# Patient Record
Sex: FEMALE | Race: WHITE | Employment: FULL TIME | URBAN - METROPOLITAN AREA
[De-identification: names, ages, dates, MRNs, and addresses within clinical notes are randomized per-mention and may not be internally consistent; named-entity substitution may affect disease eponyms.]

---

## 2022-04-06 VITALS — WEIGHT: 169 LBS | BODY MASS INDEX: 28.16 KG/M2 | HEIGHT: 65 IN

## 2022-04-06 NOTE — PERIOP NOTES
Enhanced Recovery After GYN Surgery: non-diabetic patients    It is highly recommended you purchase and drink Ensure Complete - one bottle twice daily for five days starting on 4/29/22 through 5/3/22. Ensure Complete is the preferred formula over other Ensure formulas. It is recommended that you continue drinking this for one month after surgery. The night before surgery on 5/4/22, drink 2 bottles of the Ensure Pre-Surgery drink. The morning of surgery on 5/5/22, drink one bottle of the Ensure Pre-Surgery drink while on your way to the hospital. Drink this over 5-10 minutes. Drink nothing else after drinking the pre-surgical drink the morning of surgery. Bring your patient handbook with you to the hospital.    Things to remember:    1. You will be given clear liquids to drink, advancing diet as tolerated    2. You will be up and moving around with assistance 2-4 hours after surgery. 3. You will be given regularly scheduled pain medications (NSAIDS, Tylenol, Gabapentin) with narcotics as needed. 4. You may be able to go home that night if the surgeon okays and you are up and eating and drinking. Otherwise, your discharge will be the following morning around lunch time. 5. Continue drinking Ensure Complete for 5 days after surgery.

## 2022-04-06 NOTE — PERIOP NOTES
PLEASE CONTINUE TAKING ALL PRESCRIPTION MEDICATIONS UP TO THE DAY OF SURGERY UNLESS OTHERWISE DIRECTED BELOW. DISCONTINUE all vitamins, herbals, and supplements 7 days prior to surgery. DISCONTINUE Non-Steriodal Anti-Inflammatory (NSAIDS) such as Advil, Ibuprofen, Motrin, Aspirin, Naproxen, and Aleve 5 days prior to surgery. Home Medications to take  the day of surgery (5/5/22)      NONE           Home Medications   to Hold           Comments      On the day before surgery (5/4/22)  please take Acetaminophen 1000mg in the morning and then again before bed. You may substitute for Tylenol 650 mg. Please do not bring home medications with you on the day of surgery unless otherwise directed by your nurse. If you are instructed to bring home medications, please give them to your nurse as they will be administered by the nursing staff. If you have any questions, please call [facility] [phone]. A copy of this note was provided to the patient for reference.

## 2022-04-06 NOTE — PERIOP NOTES
Patient verified name and     Order for consent NOT found in EHR; patient verified. Type 2 surgery phone assessment complete. Labs per surgeon: unknown; orders NOT received. Labs per anesthesia protocol: HGB to be collected on 22. T&S to be collected dos. EKG: not needed per anesthesia protocols. EKG dated 21 available under media tab in EHR for reference. Patient informed of GYN class on 22 at which time labs will be drawn. Patient will also receive all patient education and hospital approved surgical skin cleanser to use per hospital policy. Patient instructed to hold all vitamins 7 days prior to surgery and NSAIDS 5 days prior to surgery, patient verbalized understanding. Patient answered medical/surgical history questions at their best of ability. All prior to admission medications documented in [facility].

## 2022-04-13 ENCOUNTER — HOSPITAL ENCOUNTER (OUTPATIENT)
Dept: SURGERY | Age: 47
Discharge: HOME | End: 2022-04-13

## 2022-04-20 ENCOUNTER — HOSPITAL ENCOUNTER (OUTPATIENT)
Dept: SURGERY | Age: 47
Discharge: HOME | End: 2022-04-20
Attending: OBSTETRICS & GYNECOLOGY
Payer: COMMERCIAL

## 2022-04-20 LAB — HGB BLD-MCNC: 13 G/DL (ref 11.7–15.4)

## 2022-04-20 PROCEDURE — 36415 COLL VENOUS BLD VENIPUNCTURE: CPT

## 2022-04-20 PROCEDURE — 85018 HEMOGLOBIN: CPT

## 2022-04-20 NOTE — PROGRESS NOTES
Patient attended [facility]/ GYN surgery orientation class today. Detailed instruction book regarding GYN surgery was provided at start of class. Class content included pre-operative instructions for surgery in the week prior to and day before surgery. Packet including Hibiclens and printed instructions on bathing was provided to patient. Detailed and printed diet instruction and presurgical drinks were also given to patient  in accordance with [facility] protocol. Detailed information was given regarding arriving at the hospital and instructions for the patient's day of surgery. Discussed recovery from surgery, hospital stay, pain management, and discharge. Reviewed recovery at home including pelvic rest, driving and activity restrictions, issues requiring call to physician etc. Afsaneh Erb all questions in detail. Patient voices understanding of all.

## 2022-05-03 RX ORDER — SODIUM CHLORIDE 0.9 % (FLUSH) 0.9 %
5-40 SYRINGE (ML) INJECTION EVERY 8 HOURS
Status: CANCELLED | OUTPATIENT
Start: 2022-05-03

## 2022-05-03 RX ORDER — SODIUM CHLORIDE 0.9 % (FLUSH) 0.9 %
5-40 SYRINGE (ML) INJECTION AS NEEDED
Status: CANCELLED | OUTPATIENT
Start: 2022-05-03

## 2022-05-04 ENCOUNTER — ANESTHESIA EVENT (OUTPATIENT)
Dept: SURGERY | Age: 47
End: 2022-05-04
Payer: COMMERCIAL

## 2022-05-04 RX ORDER — SODIUM CHLORIDE 0.9 % (FLUSH) 0.9 %
5-40 SYRINGE (ML) INJECTION AS NEEDED
Status: CANCELLED | OUTPATIENT
Start: 2022-05-04

## 2022-05-04 RX ORDER — SODIUM CHLORIDE 0.9 % (FLUSH) 0.9 %
5-40 SYRINGE (ML) INJECTION EVERY 8 HOURS
Status: CANCELLED | OUTPATIENT
Start: 2022-05-04

## 2022-05-04 RX ORDER — TRISODIUM CITRATE DIHYDRATE AND CITRIC ACID MONOHYDRATE 500; 334 MG/5ML; MG/5ML
30 SOLUTION ORAL
Status: CANCELLED | OUTPATIENT
Start: 2022-05-04 | End: 2022-05-05

## 2022-05-04 NOTE — H&P
Subjective:     Christina Gutierrez, MRN: [id number], is a 40 y.o.  female presents with AUB and right ovarian mass 4-5 cm, for hysterectomy. No more children. See office notes on  care. There are no problems to display for this patient. Past Medical History:   Diagnosis Date    Anxiety     Depression     denies    Former smoker     Headache     denies    History of COVID-19 2022, 2020    not hospitalized     History of left bundle branch block (LBBB)     showed on an EKG 20 yrs ago (noted 22); EKG dated 21 \"sinus bradycardia, wnl\"     Palpitations     Postoperative keloid scar     from  and breast surgery     Thyroid disease     thyroid nodule and cyst that is monitored      Past Surgical History:   Procedure Laterality Date    HX BREAST REDUCTION Bilateral 2016    BREAST REDUCTION BILATERAL performed by Harjinder Gonzalez MD at [facility] [address] HX  SECTION      x1    HX COLONOSCOPY        [unfilled]  Allergies   Allergen Reactions    Bactrim [Sulfamethoprim Ds] Anaphylaxis and Swelling    Sulfa (Sulfonamide Antibiotics) Anaphylaxis and Swelling     Other reaction(s): Lips/Mouth swelling-Allergy    Sulfamethoxazole-Trimethoprim Other (comments)      Social History     Tobacco Use    Smoking status: Former Smoker     Packs/day: 0.25     Years: 16.00     Pack years: 4.00     Quit date: 2011     Years since quitting: 10.4    Smokeless tobacco: Never Used   Substance Use Topics    Alcohol use: Not Currently      Family History   Problem Relation Age of Onset    Heart Disease Mother     Diabetes Mother     No Known Problems Father         Review of Systems  Constitutional: negative  Respiratory: negative  Cardiovascular: negative  Musculoskeletal:negative    Objective:     No data found. No intake or output data in the 24 hours ending 22 1710  There were no vitals taken for this visit.   General appearance: alert, cooperative, no distress, appears stated age  Head: Normocephalic, without obvious abnormality, atraumatic  Back: symmetric, no curvature. ROM normal. No CVA tenderness. Lungs: clear to auscultation bilaterally  Heart: regular rate and rhythm, S1, S2 normal, no murmur, click, rub or gallop  Abdomen: soft, non-tender. Bowel sounds normal. No masses,  no organomegaly  Extremities: extremities normal, atraumatic, no cyanosis or edema  Pulses: 2+ and symmetric  Skin: Skin color, texture, turgor normal. No rashes or lesions      Assessment:     Active Problems:    * No active hospital problems. *      TLH/RSO/LS. Discussed risks of infection, DVT, damage to bowel/bladder/other internal organs, bleeding/transfusion, scar tissue/adhesions. All questions answered, will proceed.       Plan:     TLH/RSO/LS under general anesthesia

## 2022-05-04 NOTE — ANESTHESIA PREPROCEDURE EVALUATION
Anesthetic History   No history of anesthetic complications            Review of Systems / Medical History  Patient summary reviewed, nursing notes reviewed and pertinent labs reviewed    Pulmonary  Within defined limits                 Neuro/Psych         Headaches and psychiatric history     Cardiovascular                  Exercise tolerance: >4 METS     GI/Hepatic/Renal         Renal disease: stones       Endo/Other      Hypothyroidism       Other Findings   Comments: Hgb 13 in April 2022           Physical Exam    Airway  Mallampati: II  TM Distance: < 4 cm  Neck ROM: normal range of motion   Mouth opening: Diminished (comment)     Cardiovascular  Regular rate and rhythm,  S1 and S2 normal,  no murmur, click, rub, or gallop  Rhythm: regular  Rate: normal         Dental  No notable dental hx       Pulmonary  Breath sounds clear to auscultation               Abdominal         Other Findings            Anesthetic Plan    ASA: 2  Anesthesia type: general          Induction: Intravenous  Anesthetic plan and risks discussed with: Patient      Small mouth opening - GETA.

## 2022-05-05 ENCOUNTER — HOSPITAL ENCOUNTER (OUTPATIENT)
Age: 47
Discharge: HOME | End: 2022-05-05
Attending: OBSTETRICS & GYNECOLOGY | Admitting: OBSTETRICS & GYNECOLOGY
Payer: COMMERCIAL

## 2022-05-05 ENCOUNTER — ANESTHESIA (OUTPATIENT)
Dept: SURGERY | Age: 47
End: 2022-05-05
Payer: COMMERCIAL

## 2022-05-05 VITALS
OXYGEN SATURATION: 100 % | TEMPERATURE: 97.2 F | BODY MASS INDEX: 28.44 KG/M2 | HEART RATE: 79 BPM | RESPIRATION RATE: 17 BRPM | HEIGHT: 65 IN | DIASTOLIC BLOOD PRESSURE: 63 MMHG | SYSTOLIC BLOOD PRESSURE: 109 MMHG | WEIGHT: 170.7 LBS

## 2022-05-05 DIAGNOSIS — N93.9 ABNORMAL UTERINE BLEEDING (AUB): ICD-10-CM

## 2022-05-05 DIAGNOSIS — Z90.710 S/P LAPAROSCOPIC HYSTERECTOMY: Primary | ICD-10-CM

## 2022-05-05 LAB
ABO + RH BLD: NORMAL
BLOOD GROUP ANTIBODIES SERPL: NORMAL
HCG UR QL: NEGATIVE
SPECIMEN EXP DATE BLD: NORMAL

## 2022-05-05 PROCEDURE — 76010000172 HC OR TIME 2.5 TO 3 HR INTENSV-TIER 1: Performed by: OBSTETRICS & GYNECOLOGY

## 2022-05-05 PROCEDURE — 74011250636 HC RX REV CODE- 250/636: Performed by: OBSTETRICS & GYNECOLOGY

## 2022-05-05 PROCEDURE — 58571 TLH W/T/O 250 G OR LESS: CPT | Performed by: OBSTETRICS & GYNECOLOGY

## 2022-05-05 PROCEDURE — 77030037285 HC MANIP UTER DELINTR ADVINCULA DISP COOP -C: Performed by: OBSTETRICS & GYNECOLOGY

## 2022-05-05 PROCEDURE — 74011000250 HC RX REV CODE- 250

## 2022-05-05 PROCEDURE — 94760 N-INVAS EAR/PLS OXIMETRY 1: CPT

## 2022-05-05 PROCEDURE — 77030040922 HC BLNKT HYPOTHRM STRY -A: Performed by: ANESTHESIOLOGY

## 2022-05-05 PROCEDURE — 74011000250 HC RX REV CODE- 250: Performed by: ANESTHESIOLOGY

## 2022-05-05 PROCEDURE — 86900 BLOOD TYPING SEROLOGIC ABO: CPT

## 2022-05-05 PROCEDURE — 81025 URINE PREGNANCY TEST: CPT

## 2022-05-05 PROCEDURE — 77030019908 HC STETH ESOPH SIMS -A: Performed by: ANESTHESIOLOGY

## 2022-05-05 PROCEDURE — 77030003028 HC SUT VCRL J&J -A: Performed by: OBSTETRICS & GYNECOLOGY

## 2022-05-05 PROCEDURE — 76060000036 HC ANESTHESIA 2.5 TO 3 HR: Performed by: OBSTETRICS & GYNECOLOGY

## 2022-05-05 PROCEDURE — 77030008606 HC TRCR ENDOSC KII AMR -B: Performed by: OBSTETRICS & GYNECOLOGY

## 2022-05-05 PROCEDURE — 74011250636 HC RX REV CODE- 250/636: Performed by: ANESTHESIOLOGY

## 2022-05-05 PROCEDURE — 74011250636 HC RX REV CODE- 250/636

## 2022-05-05 PROCEDURE — 88307 TISSUE EXAM BY PATHOLOGIST: CPT

## 2022-05-05 PROCEDURE — 76210000063 HC OR PH I REC FIRST 0.5 HR: Performed by: OBSTETRICS & GYNECOLOGY

## 2022-05-05 PROCEDURE — 74011000250 HC RX REV CODE- 250: Performed by: OBSTETRICS & GYNECOLOGY

## 2022-05-05 PROCEDURE — 77030021678 HC GLIDESCP STAT DISP VERT -B: Performed by: ANESTHESIOLOGY

## 2022-05-05 PROCEDURE — 77030037088 HC TUBE ENDOTRACH ORAL NSL COVD-A: Performed by: ANESTHESIOLOGY

## 2022-05-05 PROCEDURE — 77030031492 HC PRT ACC BLNT AIRSEAL CNMD -B: Performed by: OBSTETRICS & GYNECOLOGY

## 2022-05-05 PROCEDURE — 2709999900 HC NON-CHARGEABLE SUPPLY: Performed by: OBSTETRICS & GYNECOLOGY

## 2022-05-05 PROCEDURE — 77030010507 HC ADH SKN DERMBND J&J -B: Performed by: OBSTETRICS & GYNECOLOGY

## 2022-05-05 PROCEDURE — 77030039425 HC BLD LARYNG TRULITE DISP TELE -A: Performed by: ANESTHESIOLOGY

## 2022-05-05 PROCEDURE — 74011250637 HC RX REV CODE- 250/637: Performed by: OBSTETRICS & GYNECOLOGY

## 2022-05-05 PROCEDURE — 77030040361 HC SLV COMPR DVT MDII -B: Performed by: OBSTETRICS & GYNECOLOGY

## 2022-05-05 PROCEDURE — 74011250637 HC RX REV CODE- 250/637: Performed by: ANESTHESIOLOGY

## 2022-05-05 PROCEDURE — 77030031139 HC SUT VCRL2 J&J -A: Performed by: OBSTETRICS & GYNECOLOGY

## 2022-05-05 PROCEDURE — 77030012770 HC TRCR OPT FX AMR -B: Performed by: OBSTETRICS & GYNECOLOGY

## 2022-05-05 RX ORDER — BUPIVACAINE HYDROCHLORIDE 5 MG/ML
INJECTION, SOLUTION EPIDURAL; INTRACAUDAL AS NEEDED
Status: DISCONTINUED | OUTPATIENT
Start: 2022-05-05 | End: 2022-05-05 | Stop reason: HOSPADM

## 2022-05-05 RX ORDER — ALBUTEROL SULFATE 0.83 MG/ML
2.5 SOLUTION RESPIRATORY (INHALATION)
Status: DISCONTINUED | OUTPATIENT
Start: 2022-05-05 | End: 2022-05-05 | Stop reason: HOSPADM

## 2022-05-05 RX ORDER — CEFAZOLIN SODIUM/WATER 2 G/20 ML
2 SYRINGE (ML) INTRAVENOUS ONCE
Status: COMPLETED | OUTPATIENT
Start: 2022-05-05 | End: 2022-05-05

## 2022-05-05 RX ORDER — PROMETHAZINE HYDROCHLORIDE 25 MG/1
25 TABLET ORAL
Status: DISCONTINUED | OUTPATIENT
Start: 2022-05-05 | End: 2022-05-05 | Stop reason: HOSPADM

## 2022-05-05 RX ORDER — DIPHENHYDRAMINE HCL 25 MG
25 CAPSULE ORAL
Status: DISCONTINUED | OUTPATIENT
Start: 2022-05-05 | End: 2022-05-05 | Stop reason: HOSPADM

## 2022-05-05 RX ORDER — ACETAMINOPHEN 325 MG/1
650 TABLET ORAL ONCE
Status: COMPLETED | OUTPATIENT
Start: 2022-05-05 | End: 2022-05-05

## 2022-05-05 RX ORDER — LIDOCAINE HYDROCHLORIDE 20 MG/ML
INJECTION, SOLUTION EPIDURAL; INFILTRATION; INTRACAUDAL; PERINEURAL AS NEEDED
Status: DISCONTINUED | OUTPATIENT
Start: 2022-05-05 | End: 2022-05-05 | Stop reason: HOSPADM

## 2022-05-05 RX ORDER — KETOROLAC TROMETHAMINE 30 MG/ML
INJECTION, SOLUTION INTRAMUSCULAR; INTRAVENOUS AS NEEDED
Status: DISCONTINUED | OUTPATIENT
Start: 2022-05-05 | End: 2022-05-05 | Stop reason: HOSPADM

## 2022-05-05 RX ORDER — PROMETHAZINE HYDROCHLORIDE 25 MG/ML
12.5 INJECTION, SOLUTION INTRAMUSCULAR; INTRAVENOUS
Status: DISCONTINUED | OUTPATIENT
Start: 2022-05-05 | End: 2022-05-05 | Stop reason: RX

## 2022-05-05 RX ORDER — OXYCODONE HYDROCHLORIDE 10 MG/1
5 TABLET ORAL
Qty: 12 TABLET | Refills: 0 | Status: SHIPPED | OUTPATIENT
Start: 2022-05-05 | End: 2022-05-10

## 2022-05-05 RX ORDER — DEXAMETHASONE SODIUM PHOSPHATE 4 MG/ML
INJECTION, SOLUTION INTRA-ARTICULAR; INTRALESIONAL; INTRAMUSCULAR; INTRAVENOUS; SOFT TISSUE AS NEEDED
Status: DISCONTINUED | OUTPATIENT
Start: 2022-05-05 | End: 2022-05-05 | Stop reason: HOSPADM

## 2022-05-05 RX ORDER — DOCUSATE SODIUM 100 MG/1
100 CAPSULE, LIQUID FILLED ORAL 2 TIMES DAILY
Status: DISCONTINUED | OUTPATIENT
Start: 2022-05-05 | End: 2022-05-05 | Stop reason: HOSPADM

## 2022-05-05 RX ORDER — ONDANSETRON 2 MG/ML
4 INJECTION INTRAMUSCULAR; INTRAVENOUS
Status: DISCONTINUED | OUTPATIENT
Start: 2022-05-05 | End: 2022-05-05 | Stop reason: HOSPADM

## 2022-05-05 RX ORDER — SODIUM CHLORIDE 0.9 % (FLUSH) 0.9 %
5-40 SYRINGE (ML) INJECTION EVERY 8 HOURS
Status: DISCONTINUED | OUTPATIENT
Start: 2022-05-05 | End: 2022-05-05 | Stop reason: HOSPADM

## 2022-05-05 RX ORDER — OXYCODONE HYDROCHLORIDE 5 MG/1
5 TABLET ORAL
Status: DISCONTINUED | OUTPATIENT
Start: 2022-05-05 | End: 2022-05-05 | Stop reason: HOSPADM

## 2022-05-05 RX ORDER — IBUPROFEN 800 MG/1
800 TABLET ORAL
Status: DISCONTINUED | OUTPATIENT
Start: 2022-05-05 | End: 2022-05-05 | Stop reason: HOSPADM

## 2022-05-05 RX ORDER — SODIUM CHLORIDE 0.9 % (FLUSH) 0.9 %
5-40 SYRINGE (ML) INJECTION AS NEEDED
Status: DISCONTINUED | OUTPATIENT
Start: 2022-05-05 | End: 2022-05-05 | Stop reason: HOSPADM

## 2022-05-05 RX ORDER — NALOXONE HYDROCHLORIDE 0.4 MG/ML
0.2 INJECTION, SOLUTION INTRAMUSCULAR; INTRAVENOUS; SUBCUTANEOUS AS NEEDED
Status: DISCONTINUED | OUTPATIENT
Start: 2022-05-05 | End: 2022-05-05 | Stop reason: HOSPADM

## 2022-05-05 RX ORDER — HYDROMORPHONE HYDROCHLORIDE 1 MG/ML
0.5 INJECTION, SOLUTION INTRAMUSCULAR; INTRAVENOUS; SUBCUTANEOUS
Status: DISCONTINUED | OUTPATIENT
Start: 2022-05-05 | End: 2022-05-05 | Stop reason: HOSPADM

## 2022-05-05 RX ORDER — EPHEDRINE SULFATE/0.9% NACL/PF 50 MG/5 ML
SYRINGE (ML) INTRAVENOUS AS NEEDED
Status: DISCONTINUED | OUTPATIENT
Start: 2022-05-05 | End: 2022-05-05 | Stop reason: HOSPADM

## 2022-05-05 RX ORDER — NEOSTIGMINE METHYLSULFATE 1 MG/ML
INJECTION, SOLUTION INTRAVENOUS AS NEEDED
Status: DISCONTINUED | OUTPATIENT
Start: 2022-05-05 | End: 2022-05-05 | Stop reason: HOSPADM

## 2022-05-05 RX ORDER — ONDANSETRON 2 MG/ML
4 INJECTION INTRAMUSCULAR; INTRAVENOUS ONCE
Status: DISCONTINUED | OUTPATIENT
Start: 2022-05-05 | End: 2022-05-05 | Stop reason: HOSPADM

## 2022-05-05 RX ORDER — IBUPROFEN 800 MG/1
800 TABLET ORAL
Qty: 40 TABLET | Refills: 1 | Status: SHIPPED | OUTPATIENT
Start: 2022-05-05 | End: 2022-05-19 | Stop reason: ALTCHOICE

## 2022-05-05 RX ORDER — PROPOFOL 10 MG/ML
INJECTION, EMULSION INTRAVENOUS AS NEEDED
Status: DISCONTINUED | OUTPATIENT
Start: 2022-05-05 | End: 2022-05-05 | Stop reason: HOSPADM

## 2022-05-05 RX ORDER — MIDAZOLAM HYDROCHLORIDE 1 MG/ML
2 INJECTION, SOLUTION INTRAMUSCULAR; INTRAVENOUS
Status: DISCONTINUED | OUTPATIENT
Start: 2022-05-05 | End: 2022-05-05 | Stop reason: HOSPADM

## 2022-05-05 RX ORDER — ONDANSETRON 2 MG/ML
INJECTION INTRAMUSCULAR; INTRAVENOUS
Status: COMPLETED
Start: 2022-05-05 | End: 2022-05-05

## 2022-05-05 RX ORDER — OXYCODONE HYDROCHLORIDE 5 MG/1
10 TABLET ORAL
Status: DISCONTINUED | OUTPATIENT
Start: 2022-05-05 | End: 2022-05-05 | Stop reason: HOSPADM

## 2022-05-05 RX ORDER — LIDOCAINE HYDROCHLORIDE 10 MG/ML
0.1 INJECTION INFILTRATION; PERINEURAL AS NEEDED
Status: DISCONTINUED | OUTPATIENT
Start: 2022-05-05 | End: 2022-05-05 | Stop reason: HOSPADM

## 2022-05-05 RX ORDER — GLYCOPYRROLATE 0.2 MG/ML
INJECTION INTRAMUSCULAR; INTRAVENOUS AS NEEDED
Status: DISCONTINUED | OUTPATIENT
Start: 2022-05-05 | End: 2022-05-05 | Stop reason: HOSPADM

## 2022-05-05 RX ORDER — ACETAMINOPHEN 500 MG
500 TABLET ORAL
Status: DISCONTINUED | OUTPATIENT
Start: 2022-05-05 | End: 2022-05-05 | Stop reason: HOSPADM

## 2022-05-05 RX ORDER — ROCURONIUM BROMIDE 10 MG/ML
INJECTION, SOLUTION INTRAVENOUS AS NEEDED
Status: DISCONTINUED | OUTPATIENT
Start: 2022-05-05 | End: 2022-05-05 | Stop reason: HOSPADM

## 2022-05-05 RX ORDER — NALOXONE HYDROCHLORIDE 0.4 MG/ML
0.4 INJECTION, SOLUTION INTRAMUSCULAR; INTRAVENOUS; SUBCUTANEOUS AS NEEDED
Status: DISCONTINUED | OUTPATIENT
Start: 2022-05-05 | End: 2022-05-05 | Stop reason: HOSPADM

## 2022-05-05 RX ORDER — ACETAMINOPHEN 500 MG
1000 TABLET ORAL
COMMUNITY
End: 2022-05-19 | Stop reason: ALTCHOICE

## 2022-05-05 RX ORDER — FENTANYL CITRATE 50 UG/ML
INJECTION, SOLUTION INTRAMUSCULAR; INTRAVENOUS AS NEEDED
Status: DISCONTINUED | OUTPATIENT
Start: 2022-05-05 | End: 2022-05-05 | Stop reason: HOSPADM

## 2022-05-05 RX ORDER — FLUMAZENIL 0.1 MG/ML
0.2 INJECTION INTRAVENOUS
Status: DISCONTINUED | OUTPATIENT
Start: 2022-05-05 | End: 2022-05-05 | Stop reason: HOSPADM

## 2022-05-05 RX ORDER — SODIUM CHLORIDE, SODIUM LACTATE, POTASSIUM CHLORIDE, CALCIUM CHLORIDE 600; 310; 30; 20 MG/100ML; MG/100ML; MG/100ML; MG/100ML
100 INJECTION, SOLUTION INTRAVENOUS CONTINUOUS
Status: DISCONTINUED | OUTPATIENT
Start: 2022-05-05 | End: 2022-05-05 | Stop reason: HOSPADM

## 2022-05-05 RX ORDER — ONDANSETRON 2 MG/ML
INJECTION INTRAMUSCULAR; INTRAVENOUS AS NEEDED
Status: DISCONTINUED | OUTPATIENT
Start: 2022-05-05 | End: 2022-05-05 | Stop reason: HOSPADM

## 2022-05-05 RX ORDER — KETAMINE HYDROCHLORIDE 50 MG/ML
INJECTION, SOLUTION INTRAMUSCULAR; INTRAVENOUS AS NEEDED
Status: DISCONTINUED | OUTPATIENT
Start: 2022-05-05 | End: 2022-05-05 | Stop reason: HOSPADM

## 2022-05-05 RX ORDER — SODIUM CHLORIDE, SODIUM LACTATE, POTASSIUM CHLORIDE, CALCIUM CHLORIDE 600; 310; 30; 20 MG/100ML; MG/100ML; MG/100ML; MG/100ML
75 INJECTION, SOLUTION INTRAVENOUS CONTINUOUS
Status: DISCONTINUED | OUTPATIENT
Start: 2022-05-05 | End: 2022-05-05 | Stop reason: HOSPADM

## 2022-05-05 RX ADMIN — PHENYLEPHRINE HYDROCHLORIDE 100 MCG: 10 INJECTION INTRAVENOUS at 11:08

## 2022-05-05 RX ADMIN — ONDANSETRON 4 MG: 2 INJECTION INTRAMUSCULAR; INTRAVENOUS at 08:55

## 2022-05-05 RX ADMIN — Medication 10 MG: at 09:08

## 2022-05-05 RX ADMIN — KETAMINE HYDROCHLORIDE 15 MG: 50 INJECTION, SOLUTION INTRAMUSCULAR; INTRAVENOUS at 08:33

## 2022-05-05 RX ADMIN — ONDANSETRON 4 MG: 2 INJECTION INTRAMUSCULAR; INTRAVENOUS at 11:00

## 2022-05-05 RX ADMIN — GLYCOPYRROLATE 0.4 MG: 0.2 INJECTION, SOLUTION INTRAMUSCULAR; INTRAVENOUS at 09:50

## 2022-05-05 RX ADMIN — KETOROLAC TROMETHAMINE 30 MG: 30 INJECTION, SOLUTION INTRAMUSCULAR; INTRAVENOUS at 09:54

## 2022-05-05 RX ADMIN — Medication 3 MG: at 09:50

## 2022-05-05 RX ADMIN — DOCUSATE SODIUM 100 MG: 100 CAPSULE ORAL at 13:51

## 2022-05-05 RX ADMIN — DEXAMETHASONE SODIUM PHOSPHATE 8 MG: 4 INJECTION, SOLUTION INTRAMUSCULAR; INTRAVENOUS at 07:48

## 2022-05-05 RX ADMIN — ACETAMINOPHEN 325MG 650 MG: 325 TABLET ORAL at 06:23

## 2022-05-05 RX ADMIN — FENTANYL CITRATE 100 MCG: 50 INJECTION INTRAMUSCULAR; INTRAVENOUS at 07:25

## 2022-05-05 RX ADMIN — Medication 10 MG: at 07:47

## 2022-05-05 RX ADMIN — Medication 10 MG: at 07:42

## 2022-05-05 RX ADMIN — PROPOFOL 200 MG: 10 INJECTION, EMULSION INTRAVENOUS at 07:32

## 2022-05-05 RX ADMIN — ROCURONIUM BROMIDE 10 MG: 10 INJECTION, SOLUTION INTRAVENOUS at 08:50

## 2022-05-05 RX ADMIN — ROCURONIUM BROMIDE 40 MG: 10 INJECTION, SOLUTION INTRAVENOUS at 07:32

## 2022-05-05 RX ADMIN — SODIUM CHLORIDE, PRESERVATIVE FREE 10 ML: 5 INJECTION INTRAVENOUS at 17:17

## 2022-05-05 RX ADMIN — KETAMINE HYDROCHLORIDE 30 MG: 50 INJECTION, SOLUTION INTRAMUSCULAR; INTRAVENOUS at 07:32

## 2022-05-05 RX ADMIN — ROCURONIUM BROMIDE 10 MG: 10 INJECTION, SOLUTION INTRAVENOUS at 07:58

## 2022-05-05 RX ADMIN — ACETAMINOPHEN 500 MG: 500 TABLET, FILM COATED ORAL at 17:17

## 2022-05-05 RX ADMIN — Medication 2 G: at 07:39

## 2022-05-05 RX ADMIN — IBUPROFEN 800 MG: 800 TABLET, FILM COATED ORAL at 13:51

## 2022-05-05 RX ADMIN — DOCUSATE SODIUM 100 MG: 100 CAPSULE ORAL at 17:17

## 2022-05-05 RX ADMIN — LIDOCAINE HYDROCHLORIDE 100 MG: 20 INJECTION, SOLUTION EPIDURAL; INFILTRATION; INTRACAUDAL; PERINEURAL at 07:32

## 2022-05-05 RX ADMIN — SODIUM CHLORIDE, SODIUM LACTATE, POTASSIUM CHLORIDE, AND CALCIUM CHLORIDE 100 ML/HR: 600; 310; 30; 20 INJECTION, SOLUTION INTRAVENOUS at 06:37

## 2022-05-05 RX ADMIN — LIDOCAINE HYDROCHLORIDE 0.1 ML: 10 INJECTION, SOLUTION INFILTRATION; PERINEURAL at 06:37

## 2022-05-05 NOTE — PROGRESS NOTES
05/05/22 1210   Dual Skin Pressure Injury Assessment   Dual Skin Pressure Injury Assessment WDL   Second Care Provider (Based on Facility Policy) DANTE Villegas   Skin Integumentary   Skin Integumentary (WDL) X    Pressure  Injury Documentation No Pressure Injury Noted-Pressure Ulcer Prevention Initiated   Skin Integrity Incision (comment)  (3 abd Lap sites)

## 2022-05-05 NOTE — PROGRESS NOTES
TRANSFER - IN REPORT:    Verbal report received from DANTE Connell(name) on Stacy Free  being received from PACU(unit) for routine progression of care      Report consisted of patients Situation, Background, Assessment and   Recommendations(SBAR). Information from the following report(s) SBAR and ED Summary was reviewed with the receiving nurse. Opportunity for questions and clarification was provided. Assessment completed upon patients arrival to unit and care assumed.

## 2022-05-05 NOTE — ANESTHESIA POSTPROCEDURE EVALUATION
Procedure(s): HYSTERECTOMY TOTAL LAPAROSCOPIC/ BILATERAL SALPINGECTOMY/ RIGHT OOPHORECTOMY.     general    Anesthesia Post Evaluation      Multimodal analgesia: multimodal analgesia used between 6 hours prior to anesthesia start to PACU discharge  Patient location during evaluation: PACU  Patient participation: complete - patient participated  Level of consciousness: awake and alert  Pain management: adequate  Airway patency: patent  Anesthetic complications: no  Cardiovascular status: acceptable  Respiratory status: acceptable  Hydration status: acceptable  Post anesthesia nausea and vomiting:  none  Final Post Anesthesia Temperature Assessment:  Normothermia (36.0-37.5 degrees C)      INITIAL Post-op Vital signs:   Vitals Value Taken Time   /58 05/05/22 1035   Temp 36.6 °C (97.9 °F) 05/05/22 1005   Pulse 53 05/05/22 1035   Resp 16 05/05/22 1035   SpO2 99 % 05/05/22 1005

## 2022-05-05 NOTE — BRIEF OP NOTE
Brief Postoperative Note    Patient: Bobbi Perry  YOB: 1975  MRN: [id number]    Date of Procedure: 5/5/2022     Pre-Op Diagnosis: Abnormal uterine bleeding (AUB) [N93.9]  Pelvic pain [R10.2]    Post-Op Diagnosis: Same as preoperative diagnosis. Procedure(s): HYSTERECTOMY TOTAL LAPAROSCOPIC/ BILATERAL SALPINGECTOMY/ RIGHT OOPHORECTOMY    Surgeon(s):  MD Pau Gonzalez MD    Surgical Assistant: Onur Lopez MD    Anesthesia: General     Estimated Blood Loss (mL): less than 444     Complications:  Other: severe pelvic adhesions    Specimens:   ID Type Source Tests Collected by Time Destination   1 : uterus, bilateral tubes, right ovary Fresh   Gumaro Zhong MD 5/5/2022 0910 Pathology        Implants: * No implants in log *    Drains: * No LDAs found *    Findings: enlarged uterus, enlarged right ovary, severe adhesions from uterus to anterior peritoneum, adhesions of left ovary and tube    Electronically Signed by Dayami Betancourt MD on 5/5/2022 at 10:19 AM

## 2022-05-05 NOTE — PROGRESS NOTES
's pre-procedure visit requested by patient. Conveyed care and concern for patient and family. Offered prayer as requested for patient, family, and staff.     Hansel Hoff MDiv, BS  Board Certified

## 2022-05-05 NOTE — PROGRESS NOTES
Chart screen completed. Pt is a 41 y/o female admitted for a total hysterectomy. Pt is insured and has a PCP. No discharge planning needs noted, however CM will continue to follow.

## 2022-05-05 NOTE — PERIOP NOTES
Patient drank Pre-Surgical drink as instructed:   2 Pre Surgical drinks before midnight and Pre Surgical drink consumed over 5-10 minutes PTA DOS    Warmer placed on patient's bed. Warm IV fluids infusing in pre-op per [facility] protocol. Pt's , Daryle Skene [phone], in waiting room with pt's belongings.

## 2022-05-06 NOTE — DISCHARGE SUMMARY
[facility] Discharge Summary     Patient ID:  Ghazala Hastings  [id number]  40 y.o.  1975    Admit date: 5/5/2022    Discharge date and time: 5/5/2022  7:23 PM     Admitting Physician: Sherri Bro MD     Discharge Physician: Toula Cooks, M.D. Admission Diagnoses: Abnormal uterine bleeding (AUB) [N93.9]; Pelvic pain [R10.2]    Problem List: Hospital - Active Problems:    Abnormal uterine bleeding (AUB) (5/5/2022)     ; Other -   Patient Active Problem List   Diagnosis Code    Abnormal uterine bleeding (AUB) N93.9        Discharge Diagnoses: Abnormal uterine bleeding (AUB) [N93.9]; Pelvic pain [R10.2]    Hospital Course: Ghazala Hastings had unremarkeable progressive recovery. and Eating, ambulating, and voiding in a routine manner. Significant Diagnostic Studies: No results found for this or any previous visit (from the past 24 hour(s)). Procedures: Total Laparoscopic Hysterectomy with Right Salpingo-Oophorectomy and LEFT Salpingectomy, LYSIS of severe pelvic adhesions    Discharge Exam:  Visit Vitals  /63 (BP 1 Location: Left upper arm, BP Patient Position: Sitting)   Pulse 79   Temp 97.2 °F (36.2 °C)   Resp 17   Ht 5' 5\" (1.651 m)   Wt 170 lb 11.2 oz (77.4 kg)   SpO2 100%   BMI 28.41 kg/m²      Heart: regular rate and rhythm, S1, S2 normal, no murmur, click, rub or gallop  Lungs: clear to auscultation bilaterally  Extremities: normal, atraumatic, no cyanosis or edema, no DVT  Incision: no significant drainage, no dehiscence, no significant erythema    Patient Instructions:   Cannot display discharge medications since this patient is not currently admitted. Activity: physical activity is restricted per discharge instructions  Diet: resume normal diet  Wound Care: Keep wound clean and dry, Reinforce dressing PRN and As directed    Follow-up with Sherri Bro MD in 2 weeks.     Signed:  Sherri Bro MD  5/6/2022  10:33 AM

## 2022-05-09 NOTE — OP NOTES
[place]  OPERATIVE REPORT    Name:  Mandy River  MR#:  [id number]  :  1975  ACCOUNT #:  [de-identified]  DATE OF SERVICE:  2022    PREOPERATIVE DIAGNOSIS:  The patient with abnormal uterine bleeding and pelvic pain. POSTOPERATIVE DIAGNOSIS:  The patient with abnormal uterine bleeding and pelvic pain. PROCEDURES PERFORMED:  1. Total laparoscopic hysterectomy. 2.  Bilateral salpingectomy. 3.  Right oophorectomy. 4.  Lysis of severe pelvic adhesions. 5.  Observational cystoscopy. SURGEON:  Carson Torre MD    ASSISTANT:  Ivy Galeas MD    ANESTHESIA:  General.    COMPLICATIONS:  Severe pelvic adhesions. SPECIMENS REMOVED:  Uterus, cervix, right tube and ovary, and left fallopian tube. IMPLANTS:  None. ESTIMATED BLOOD LOSS:  Less than 100 mL. DRAIN:  White. FINDINGS:  Enlarged uterus, enlarged right ovary with severe adhesions from the uterus to the anterior peritoneum and adhesions of the left ovary and tube. PROCEDURE:  After informed consent, the patient was taken to the operating room and given general anesthesia. She was prepped and draped in the usual sterile fashion in the dorsal lithotomy position. Time-out was accomplished. The weighted speculum was placed in the posterior vagina, the cervix was visualized and grasped with tenaculum. The uterine manipulation device was now placed in the uterine cavity after dilation. The tenaculum and the weighted speculum were removed. Laparoscopy was begun by infiltrating the infraumbilical area with dilute Marcaine. A #11 blade was used to make a 5 mm incision. The Veress needle was now passed through this incision into the abdominal cavity. The abdomen was insufflated with 3.5 liters of CO2. The Veress needle was withdrawn and a 5 mm disposable trocar was passed through this incision, the obturator was removed and a laparoscope was placed through the sleeve.   The patient was placed in Trendelenburg, uterus was elevated and it was deemed possible to continue with the laparoscopic approach. At this time, it was obvious that the uterus was severely adhered to the anterior peritoneum with a large amount of surface area involved. We could not actually see the bladder behind this. The right ovary did have a cyst which had been seen on ultrasound, so the patient had decided she wanted the right ovary removed, but wanted to retain the left ovary. The left ovary was adhered to the left portion of the uterus with some adhesions, but was salvageable. The left fallopian tube was twisted around with the fimbriae actually pointing to the midline. The Ace harmonic was now set up. An 8 mm port was placed in the right lower quadrant under direct visualization again using Marcaine. Left lower quadrant was accessed with a 10 mm port. The AirSeal device was placed through the right lower quadrant. The Ace harmonic was now used as well as a grasping device to pull the left ovary away from the uterine attachment, and then the fallopian tube was identified and cauterized at the mesosalpinx and removed through the left lower quadrant. We then cauterized the left utero-ovarian ligament as well as the round ligament and the broad ligament. On the right side, the infundibulopelvic ligament was cauterized and cut and then the round ligament and broad ligament. We then turned our attention to the uterus being severely plastered against the anterior peritoneum. We were able to cut this away with tedious dissection, however, never were able to find a definite plane. Some of the uterine serosa did remain on the anterior peritoneum. We then after about 15-20 minutes of dissection at this area were able to pull the uterus away and identify the upper aspect of the bladder.   Once this was accomplished, we were then able to tease away the peritoneum over the uterine vessels on either side, identified the uterine vessels and then cauterized these and cut them. Then pushed the bladder away down toward the cervix. Once the uterus was completely freed off the anterior peritoneum, we were able to now push the bladder over the cup of the uterine manipulator to identify the anterior and posterior fornix of the vagina. Ace harmonic was now used to cut into the vagina, performing a colpotomy in a 360-degree fashion all way around, releasing the cervix from the vagina. The uterus with the cervix and the right tube and ovary were now pulled through the vaginal opening. The bulb was now placed into the vagina for maintaining pneumoperitoneum. Irrigation was now carried out. The vaginal cuff was now closed with extracorporal sutures of 0 Vicryls. These were placed in each angle, essentially throwing a Khoury angle stitch. These were interrupted sutures and the cuff was now closed in anterior-posterior fashion placing five more interrupted sutures of the 0 Vicryl. There was good closure and good pneumoperitoneum maintained after the bulb was removed. Cystoscopy was now performed by placing 250 mL of irrigant into the bladder as the White was removed. The laparoscope was now placed through the urethra gently and both ureters seemed to be working well, urine being ejected. There was no damage to the bladder. The bladder was now drained with a White. The laparoscope was now placed through the umbilical port and reinflated, there was no bleeding. We deflated one more time to less than 5 mm of pressure to make sure there was no bleeding, copious irrigation was  carried out. Pictures were taken before and after. At this time, the procedure was terminated as all the ports were removed under direct visualization. The ports were closed at the skin layer with 4-0 Vicryl and reinforced with Dermabond. All the vaginal instruments had been removed.   The counts were correct x2 and the patient was now taken to Recovery in stable condition. Dr Freda Carbajal assistance was required due to the complexity of case. Assistant performed R side of the hysterectomy and positioned the uterine manipulator and provided counter traction for dissection throughout the procedure.     Grey Augustine MD      [id number]  D:  05/08/2022 9:51  T:  05/08/2022 20:34  JOB #:  [id number]

## 2022-06-22 NOTE — PROGRESS NOTES
Isabelle presents for postop visit from [address] [place] [address] about 6 weeks ago. {doing well postop:09756}{findings; bleedin::\"without any bleeding\"}. Fever: {YES/NO:99753} Voiding well: {YES/NO:11166}. Bowel movements OK: {YES/NO:36695}. There were no vitals taken for this visit. Exam: A&OX3, NAD. Abdomen:  {abdomen; palpation:97163} Incisions {Post-op wounds:47447}    A/P. Stable Post op condition. Gradually increase activity. Resumption of sexual activity {IS/IS FLF:21914} encouraged at this time. Follow up {NUMBERS 1-10:31343} weeks.

## 2022-06-23 ENCOUNTER — OFFICE VISIT (OUTPATIENT)
Dept: OBGYN CLINIC | Age: 47
End: 2022-06-23

## 2022-06-23 VITALS
DIASTOLIC BLOOD PRESSURE: 80 MMHG | BODY MASS INDEX: 27.99 KG/M2 | WEIGHT: 168 LBS | HEIGHT: 65 IN | SYSTOLIC BLOOD PRESSURE: 130 MMHG

## 2022-06-23 DIAGNOSIS — Z98.890 POST-OPERATIVE STATE: Primary | ICD-10-CM

## 2022-06-23 PROCEDURE — 99024 POSTOP FOLLOW-UP VISIT: CPT | Performed by: OBSTETRICS & GYNECOLOGY

## 2022-06-23 NOTE — PROGRESS NOTES
Isabelle presents for postop visit from [address] [place] [address] about 6 weeks ago. Doing well postoperatively. without any bleeding. Fever: NO Voiding well: YES. Bowel movements OK: YES. Ht 5' 5\" (1.651 m)   Wt 168 lb (76.2 kg)   BMI 27.96 kg/m²     Exam: A&OX3, NAD. Abdomen:  Non tender Incisions clean, dry, intact  BUSEG wnl, vagina/cuff healed  A/P. Stable Post op condition. Gradually increase activity. Resumption of sexual activity is  encouraged at this time. Follow up 1 year. Discussed endometriosis ? Returning in future.

## 2023-07-17 NOTE — PROGRESS NOTES
HPI: Ms. Rinku Kramer is a 56 y.o.   OB History          2    Para        Term   2       0    AB   0    Living   2         SAB        IAB        Ectopic        Molar        Multiple        Live Births                 who is here today for a well woman exam. She complains of nothing. Has left ovary   Date Performed Result   PAP 2021  Negative. HPV Negative. Mammogram 2023 Benign. Colonoscopy Several years ago wnl   Dexa NA NA             GYN History           No LMP recorded. Past Medical History:  Past Medical History:   Diagnosis Date    Anxiety     Depression     denies    Former smoker     Headache     denies    History of COVID-19 2022, 2020    not hospitalized     History of left bundle branch block (LBBB)     showed on an EKG 20 yrs ago (noted 22); EKG dated 21 \"sinus bradycardia, wnl\"     Palpitations     Postoperative keloid scar     from  and breast surgery     Thyroid disease     thyroid nodule and cyst that is monitored       Past Surgical History:  Past Surgical History:   Procedure Laterality Date    BREAST REDUCTION SURGERY Bilateral 2016    BREAST REDUCTION BILATERAL performed by Janel Koyanagi, MD at [place] [address]    COLONOSCOPY         Allergies: Allergies   Allergen Reactions    Sulfa Antibiotics Anaphylaxis and Swelling     Other reaction(s): Lips/Mouth swelling-Allergy    Sulfamethoxazole-Trimethoprim Other (See Comments)       Medication History:  No current outpatient medications on file. No current facility-administered medications for this visit.        Social History:  Social History     Socioeconomic History    Marital status:      Spouse name: Not on file    Number of children: Not on file    Years of education: Not on file    Highest education level: Not on file   Occupational History    Not on file   Tobacco Use    Smoking status: Former     Packs/day: 0.25     Types:

## 2023-07-18 ENCOUNTER — OFFICE VISIT (OUTPATIENT)
Dept: OBGYN CLINIC | Age: 48
End: 2023-07-18
Payer: COMMERCIAL

## 2023-07-18 VITALS — WEIGHT: 175.3 LBS | BODY MASS INDEX: 29.17 KG/M2

## 2023-07-18 DIAGNOSIS — Z13.89 SCREENING FOR GENITOURINARY CONDITION: ICD-10-CM

## 2023-07-18 DIAGNOSIS — Z01.419 WELL WOMAN EXAM: Primary | ICD-10-CM

## 2023-07-18 DIAGNOSIS — Z12.31 ENCOUNTER FOR SCREENING MAMMOGRAM FOR BREAST CANCER: ICD-10-CM

## 2023-07-18 PROCEDURE — 99396 PREV VISIT EST AGE 40-64: CPT | Performed by: OBSTETRICS & GYNECOLOGY

## 2023-07-18 SDOH — ECONOMIC STABILITY: HOUSING INSECURITY
IN THE LAST 12 MONTHS, WAS THERE A TIME WHEN YOU DID NOT HAVE A STEADY PLACE TO SLEEP OR SLEPT IN A SHELTER (INCLUDING NOW)?: NO

## 2023-07-18 SDOH — ECONOMIC STABILITY: INCOME INSECURITY: HOW HARD IS IT FOR YOU TO PAY FOR THE VERY BASICS LIKE FOOD, HOUSING, MEDICAL CARE, AND HEATING?: NOT HARD AT ALL

## 2023-07-18 SDOH — ECONOMIC STABILITY: FOOD INSECURITY: WITHIN THE PAST 12 MONTHS, YOU WORRIED THAT YOUR FOOD WOULD RUN OUT BEFORE YOU GOT MONEY TO BUY MORE.: NEVER TRUE

## 2023-07-18 SDOH — ECONOMIC STABILITY: TRANSPORTATION INSECURITY
IN THE PAST 12 MONTHS, HAS LACK OF TRANSPORTATION KEPT YOU FROM MEETINGS, WORK, OR FROM GETTING THINGS NEEDED FOR DAILY LIVING?: NO

## 2023-07-18 SDOH — ECONOMIC STABILITY: FOOD INSECURITY: WITHIN THE PAST 12 MONTHS, THE FOOD YOU BOUGHT JUST DIDN'T LAST AND YOU DIDN'T HAVE MONEY TO GET MORE.: NEVER TRUE

## 2023-09-02 LAB — NONINV COLON CA DNA+OCC BLD SCRN STL QL: NEGATIVE

## 2024-03-28 NOTE — PROGRESS NOTES
The patient is a 48 y.o.  who is seen for US and visit.    US findings from today 3/29/24   GYN U/S SECONDARY TO LLQ PAIN  CX AND UTERUS SURGICALLY ABSENT. VAGINAL CUFF WNL  ROV SURGICALLY ABSENT  LOV VISUALIZED WITH FOLLICLES AND WNL  NO ADN MASSES OF FREE FLUID VISUALIZED.    HISTORY:      No LMP recorded (lmp unknown). Patient has had a hysterectomy.  Sexual History:  single partner, contraception - status post hysterectomy  Contraception:  status post hysterectomy  No current outpatient medications on file prior to visit.     No current facility-administered medications on file prior to visit.     I reviewed her surgery( hysterectomy) 2 years ago. 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  ROS:  Feeling well. No dyspnea or chest pain on exertion.  POSITIVE LLQ abdominal pain, No change in bowel habits, black or bloody stools.  No urinary tract symptoms. GYN ROS: no breast pain or new or enlarging lumps on self exam.    PHYSICAL EXAM:  There were no vitals taken for this visit.    The patient appears well, alert, oriented x 3, in no distress.      ASSESSMENT:    Occ LL pain c/w ovarian origin. Explained that the U/S shows follicles , but no cyst. Could be scar tissue with colon expansion. Also probably ovulatory, should get better with menopause.    PLAN:  All questions answered; no need to proceed with high risk surgery for now, will monitor and call back if worsens  Diagnosis explained in detail, including differential  Follow-up as needed   today - no family Hx

## 2024-03-29 ENCOUNTER — PROCEDURE VISIT (OUTPATIENT)
Dept: OBGYN CLINIC | Age: 49
End: 2024-03-29
Payer: COMMERCIAL

## 2024-03-29 ENCOUNTER — OFFICE VISIT (OUTPATIENT)
Dept: OBGYN CLINIC | Age: 49
End: 2024-03-29
Payer: COMMERCIAL

## 2024-03-29 VITALS
WEIGHT: 176.4 LBS | DIASTOLIC BLOOD PRESSURE: 70 MMHG | HEIGHT: 65 IN | BODY MASS INDEX: 29.39 KG/M2 | SYSTOLIC BLOOD PRESSURE: 114 MMHG

## 2024-03-29 DIAGNOSIS — R10.32 LLQ PAIN: Primary | ICD-10-CM

## 2024-03-29 DIAGNOSIS — R10.2 PELVIC PAIN: Primary | ICD-10-CM

## 2024-03-29 PROCEDURE — 99214 OFFICE O/P EST MOD 30 MIN: CPT | Performed by: OBSTETRICS & GYNECOLOGY

## 2024-03-29 PROCEDURE — 76830 TRANSVAGINAL US NON-OB: CPT | Performed by: OBSTETRICS & GYNECOLOGY

## 2024-04-01 LAB — CANCER AG125 SERPL-ACNC: 3 U/ML (ref 1.5–35)

## (undated) DEVICE — SYR 10ML LUER LOK 1/5ML GRAD --

## (undated) DEVICE — INTENDED FOR TISSUE SEPARATION, AND OTHER PROCEDURES THAT REQUIRE A SHARP SURGICAL BLADE TO PUNCTURE OR CUT.: Brand: BARD-PARKER ® STAINLESS STEEL BLADES

## (undated) DEVICE — AIRSEAL 5 MM ACCESS PORT AND LOW PROFILE OBTURATOR WITH BLADELESS OPTICAL TIP, 120 MM LENGTH: Brand: AIRSEAL

## (undated) DEVICE — LAPAROSCOPIC TROCAR SLEEVE/SINGLE USE: Brand: KII® OPTICAL ACCESS SYSTEM

## (undated) DEVICE — TROCAR: Brand: KII FIOS FIRST ENTRY

## (undated) DEVICE — SOLUTION IRRIG 1000ML 0.9% SOD CHL USP POUR PLAS BTL

## (undated) DEVICE — SOLUTION IRRIG 3000ML 0.9% SOD CHL USP UROMATIC PLAS CONT

## (undated) DEVICE — SUTURE VCRL + SZ 4-0 L27IN ABSRB UD PS-2 3/8 CIR REV CUT VCP426H

## (undated) DEVICE — SUTURE VCRL SZ 0 L36IN ABSRB UD L36MM CT-1 1/2 CIR J946H

## (undated) DEVICE — SYR 50ML LR LCK 1ML GRAD NSAF --

## (undated) DEVICE — SHEARS ENDOSCP HARM 36CM ULTRASONIC CRV TIP UPGRD

## (undated) DEVICE — CANISTER, RIGID, 2000CC: Brand: MEDLINE INDUSTRIES, INC.

## (undated) DEVICE — TRI-LUMEN FILTERED TUBE SET WITH ACTIVATED CHARCOAL FILTER: Brand: AIRSEAL

## (undated) DEVICE — GLOVE SURG SZ 7 CRM LTX FREE POLYISOPRENE POLYMER BEAD ANTI

## (undated) DEVICE — GARMENT,MEDLINE,DVT,INT,CALF,MED, GEN2: Brand: MEDLINE

## (undated) DEVICE — 40585 XL ADVANCED TRENDELENBURG POSITIONING KIT: Brand: 40585 XL ADVANCED TRENDELENBURG POSITIONING KIT

## (undated) DEVICE — GYN LAPAROSCOPY: Brand: MEDLINE INDUSTRIES, INC.

## (undated) DEVICE — SUT VCRL + 0 36IN CT1 UD --

## (undated) DEVICE — SYRINGE IRRIG 60ML SFT PLIABLE BLB EZ TO GRP 1 HND USE W/

## (undated) DEVICE — DELINEATOR MANIPULATOR 3.5CM -- ADVINCULA

## (undated) DEVICE — DERMABOND SKIN ADH 0.7ML -- DERMABOND ADVANCED 12/BX